# Patient Record
Sex: MALE | Race: WHITE | NOT HISPANIC OR LATINO | Employment: OTHER | URBAN - NONMETROPOLITAN AREA
[De-identification: names, ages, dates, MRNs, and addresses within clinical notes are randomized per-mention and may not be internally consistent; named-entity substitution may affect disease eponyms.]

---

## 2019-12-11 ENCOUNTER — HOSPITAL ENCOUNTER (EMERGENCY)
Facility: HOSPITAL | Age: 39
Discharge: HOME | End: 2019-12-11
Admitting: EMERGENCY MEDICINE

## 2019-12-11 VITALS
RESPIRATION RATE: 16 BRPM | WEIGHT: 228 LBS | DIASTOLIC BLOOD PRESSURE: 87 MMHG | OXYGEN SATURATION: 99 % | HEIGHT: 72 IN | SYSTOLIC BLOOD PRESSURE: 135 MMHG | BODY MASS INDEX: 30.88 KG/M2 | HEART RATE: 85 BPM | TEMPERATURE: 97.2 F

## 2019-12-11 DIAGNOSIS — S61.012A LACERATION OF LEFT THUMB WITHOUT FOREIGN BODY WITHOUT DAMAGE TO NAIL, INITIAL ENCOUNTER: Primary | ICD-10-CM

## 2019-12-11 PROCEDURE — 99283 EMERGENCY DEPT VISIT LOW MDM: CPT

## 2019-12-11 PROCEDURE — 90715 TDAP VACCINE 7 YRS/> IM: CPT | Performed by: NURSE PRACTITIONER

## 2019-12-11 PROCEDURE — 90471 IMMUNIZATION ADMIN: CPT | Performed by: NURSE PRACTITIONER

## 2019-12-11 PROCEDURE — 25010000002 TDAP 5-2.5-18.5 LF-MCG/0.5 SUSPENSION: Performed by: NURSE PRACTITIONER

## 2019-12-11 RX ORDER — LIDOCAINE HYDROCHLORIDE 10 MG/ML
10 INJECTION, SOLUTION INFILTRATION; PERINEURAL ONCE
Status: DISCONTINUED | OUTPATIENT
Start: 2019-12-11 | End: 2019-12-11 | Stop reason: HOSPADM

## 2019-12-11 RX ORDER — CEPHALEXIN 500 MG/1
500 CAPSULE ORAL 4 TIMES DAILY
Qty: 28 CAPSULE | Refills: 0 | Status: SHIPPED | OUTPATIENT
Start: 2019-12-11 | End: 2019-12-18

## 2019-12-11 RX ADMIN — TETANUS TOXOID, REDUCED DIPHTHERIA TOXOID AND ACELLULAR PERTUSSIS VACCINE, ADSORBED 0.5 ML: 5; 2.5; 8; 8; 2.5 SUSPENSION INTRAMUSCULAR at 09:33

## 2019-12-11 NOTE — DISCHARGE INSTRUCTIONS
Follow up with one of the [facility] physician groups below to setup primary care. If you have trouble making an appointment, please call the [facility] Nurse Line at [phone]    Dr. Trina Petty DO, Dr. Jones Johnson DO, and REINA Navarrete  [facility]  [address] [place] [address], [id number] [phone]    Dr. Marshal Erickson MD  [facility] - [address] [address], [address], [place], [place] [id number] [phone]    Dr. Boo Rosen DO, Dr. Raymond Guaman DO,  REINA Garvin, and REINA Galindo  [facility] - [address] [address], [address], [place], [place] [address] [phone]     Dr. Khadijah Anthony MD, and REINA Reich  [facility] [address] [id number] [phone]    Dr. Mauricio Javier MD and Dr. Hossein Garcia MD  [address], [address] [phone]    Dr. Ismael Zaman MD  [facility]  [address] [place] [address], [address], [place], [place], [address] [phone]    Dr. Que Brown MD  [facility]  [address], [place], [address] [phone]      Increase fluids.  Ibuprofen or Naproxen as needed for pain. Medication as ordered. Elevate hand.  Follow wound care instructions.  Sutures out in 7-10 days. Follow up with PCP tomorrow - call for appointment. Return to ED if condition does not improve or worsens

## 2019-12-11 NOTE — ED PROVIDER NOTES
Subjective   39 yom c/o laceration to the left thumb. He states he was taking down cabinets when one injured his left thumb. No active bleeding.  His TD is not UTD.  He has full ROM of the left thumb.          Review of Systems   Constitutional: Negative for activity change, appetite change, fatigue and fever.   HENT: Negative for congestion, ear pain, facial swelling and sore throat.    Eyes: Negative for discharge and visual disturbance.   Respiratory: Negative for apnea, chest tightness, shortness of breath, wheezing and stridor.    Cardiovascular: Negative for chest pain and palpitations.   Gastrointestinal: Negative for abdominal distention, abdominal pain, diarrhea, nausea and vomiting.   Genitourinary: Negative for difficulty urinating and dysuria.   Musculoskeletal: Negative for arthralgias and myalgias.   Skin: Negative for rash and wound.   Neurological: Negative for dizziness and seizures.   Psychiatric/Behavioral: Negative for agitation and confusion.       History reviewed. No pertinent past medical history.    Allergies   Allergen Reactions   • Morphine Paresthesia       Past Surgical History:   Procedure Laterality Date   • KNEE ACL RECONSTRUCTION Left        History reviewed. No pertinent family history.    Social History     Socioeconomic History   • Marital status: Single     Spouse name: Not on file   • Number of children: Not on file   • Years of education: Not on file   • Highest education level: Not on file   Tobacco Use   • Smoking status: Light Tobacco Smoker     Types: Cigarettes   • Tobacco comment: socially   Substance and Sexual Activity   • Alcohol use: Yes   • Drug use: Yes           Objective   Physical Exam   Constitutional: He is oriented to person, place, and time. He appears well-developed.   HENT:   Head: Normocephalic.   Eyes: Pupils are equal, round, and reactive to light. EOM are normal.   Neck: Normal range of motion. Neck supple.   Cardiovascular: Normal rate and regular  rhythm.   No murmur heard.  Pulmonary/Chest: Effort normal and breath sounds normal.   Abdominal: Soft. Bowel sounds are normal.   Musculoskeletal: Normal range of motion.   Neurological: He is alert and oriented to person, place, and time.   Skin: Skin is warm and dry.   Approximate 3.5cm laceration present to the lateral aspect of the left thumb.  Bleeding is controlled. He has full ROM of the left thumb   Psychiatric: He has a normal mood and affect.   Nursing note and vitals reviewed.      Laceration Repair  Date/Time: 12/11/2019 10:00 AM  Performed by: Andrea Cook APRN  Authorized by: Andrea Cook APRN     Consent:     Consent obtained:  Verbal    Consent given by:  Patient    Risks discussed:  Infection, pain, retained foreign body, poor cosmetic result, nerve damage, poor wound healing, vascular damage and tendon damage    Alternatives discussed:  No treatment  Anesthesia (see MAR for exact dosages):     Anesthesia method:  Local infiltration    Local anesthetic:  Lidocaine 1% w/o epi  Laceration details:     Location:  Finger    Finger location:  L thumb    Length (cm):  3.5  Repair type:     Repair type:  Simple  Pre-procedure details:     Preparation:  Patient was prepped and draped in usual sterile fashion  Exploration:     Wound exploration: wound explored through full range of motion      Contaminated: no    Treatment:     Area cleansed with:  Hibiclens    Amount of cleaning:  Extensive    Irrigation solution:  Sterile saline    Irrigation volume:  50    Irrigation method:  Syringe    Visualized foreign bodies/material removed: no    Skin repair:     Repair method:  Sutures    Suture size:  4-0    Suture material:  Nylon    Suture technique:  Simple interrupted    Number of sutures:  9  Approximation:     Approximation:  Close  Post-procedure details:     Dressing:  Antibiotic ointment and non-adherent dressing    Patient tolerance of procedure:  Tolerated well, no immediate  complications               ED Course                      No data recorded                        MDM  Number of Diagnoses or Management Options  Laceration of left thumb without foreign body without damage to nail, initial encounter: new and does not require workup  Risk of Complications, Morbidity, and/or Mortality  Presenting problems: low  Diagnostic procedures: minimal  Management options: low    Patient Progress  Patient progress: improved      Final diagnoses:   Laceration of left thumb without foreign body without damage to nail, initial encounter              Andrea Cook, APRN  12/12/19 1165

## 2019-12-20 ENCOUNTER — HOSPITAL ENCOUNTER (EMERGENCY)
Facility: HOSPITAL | Age: 39
Discharge: HOME | End: 2019-12-20
Admitting: EMERGENCY MEDICINE

## 2019-12-20 VITALS
RESPIRATION RATE: 14 BRPM | SYSTOLIC BLOOD PRESSURE: 153 MMHG | WEIGHT: 232 LBS | HEART RATE: 114 BPM | BODY MASS INDEX: 31.42 KG/M2 | OXYGEN SATURATION: 96 % | HEIGHT: 72 IN | TEMPERATURE: 97.9 F | DIASTOLIC BLOOD PRESSURE: 99 MMHG

## 2019-12-20 DIAGNOSIS — Z48.02 VISIT FOR SUTURE REMOVAL: Primary | ICD-10-CM

## 2019-12-20 PROCEDURE — 99201: CPT

## 2019-12-20 NOTE — DISCHARGE INSTRUCTIONS
Increase fluids.  Tylenol or motrin as needed for pain/fever.  Keep area clean and dry.  Follow up with PCP Monday - call for appointment. Return to ED if condition does not improve or worsens

## 2019-12-20 NOTE — ED PROVIDER NOTES
Subjective   39 yom here for suture removal.  He had 9 sutures placed to the left thumb on on 12/11/19. Area is well approximated and has no redness or drainage.          Review of Systems   Constitutional: Negative for activity change, appetite change, fatigue and fever.   HENT: Negative for congestion, ear pain, facial swelling and sore throat.    Eyes: Negative for discharge and visual disturbance.   Respiratory: Negative for apnea, chest tightness, shortness of breath, wheezing and stridor.    Cardiovascular: Negative for chest pain and palpitations.   Gastrointestinal: Negative for abdominal distention, abdominal pain, diarrhea, nausea and vomiting.   Genitourinary: Negative for difficulty urinating and dysuria.   Musculoskeletal: Negative for arthralgias and myalgias.   Skin: Positive for wound. Negative for rash.   Neurological: Negative for dizziness and seizures.   Psychiatric/Behavioral: Negative for agitation and confusion.       History reviewed. No pertinent past medical history.    Allergies   Allergen Reactions   • Morphine Paresthesia       Past Surgical History:   Procedure Laterality Date   • KNEE ACL RECONSTRUCTION Left        History reviewed. No pertinent family history.    Social History     Socioeconomic History   • Marital status: Single     Spouse name: Not on file   • Number of children: Not on file   • Years of education: Not on file   • Highest education level: Not on file   Tobacco Use   • Smoking status: Light Tobacco Smoker     Types: Cigarettes   • Smokeless tobacco: Never Used   • Tobacco comment: socially   Substance and Sexual Activity   • Alcohol use: Yes   • Drug use: Yes   • Sexual activity: Defer           Objective   Physical Exam   Skin:   Area to left thumb is well approximated.  There is no redness or drainage.  He has full ROM of the left thumb   Nursing note and vitals reviewed.      Procedures           ED Course                      No data recorded                         MDM  Number of Diagnoses or Management Options  Visit for suture removal: minor  Risk of Complications, Morbidity, and/or Mortality  Presenting problems: minimal  Diagnostic procedures: minimal  Management options: minimal    Patient Progress  Patient progress: improved      Final diagnoses:   Visit for suture removal              Andrea Cook, APRN  12/20/19 [id number]

## 2021-02-13 ENCOUNTER — OFFICE VISIT (OUTPATIENT)
Dept: URGENT CARE | Age: 41
End: 2021-02-13

## 2021-02-13 VITALS
BODY MASS INDEX: 33.89 KG/M2 | DIASTOLIC BLOOD PRESSURE: 96 MMHG | HEART RATE: 125 BPM | WEIGHT: 250.2 LBS | TEMPERATURE: 97.8 F | HEIGHT: 72 IN | OXYGEN SATURATION: 97 % | RESPIRATION RATE: 20 BRPM | SYSTOLIC BLOOD PRESSURE: 164 MMHG

## 2021-02-13 DIAGNOSIS — S01.81XA FACIAL LACERATION, INITIAL ENCOUNTER: Primary | ICD-10-CM

## 2021-02-13 PROCEDURE — 12051 INTMD RPR FACE/MM 2.5 CM/<: CPT | Performed by: PHYSICIAN ASSISTANT

## 2021-02-13 RX ADMIN — Medication: at 15:30

## 2021-02-13 ASSESSMENT — VISUAL ACUITY: OU: 1

## 2021-02-13 NOTE — PROGRESS NOTES
Subjective:      Patient ID: Giovanni Anguiano is a 45 y.o. male. HPI  Mr. Raphael Barragan presents with a laceration above his left eyebrow. He reports he was at home drinking bourbon last night. He does not recall how he injured himself. He thinks it happened around 7 am.  He delayed coming in to let the alcohol wear off. He denies any other injuries. He denies any vision changes, headache etc.      Review of Systems   All other systems reviewed and are negative. Objective:   Physical Exam  HENT:      Head:        Comments: No step offs upon palpation of the left orbital bone. Eyes:      General: Lids are normal. Vision grossly intact. Gaze aligned appropriately. Extraocular Movements:      Right eye: Normal extraocular motion. Left eye: Normal extraocular motion. Pupils: Pupils are equal, round, and reactive to light. Comments: There is swelling surrounding the laceration. Neck:      Musculoskeletal: Full passive range of motion without pain. Assessment:      Laceration above left eyebrow      Plan:      We discussed repair of his laceration and he would like to proceed.           Guillermina Mccormack PA-C

## 2021-02-13 NOTE — PROGRESS NOTES
PROCEDURE:  The wound was cleaned with saline. The skin was cleaned with alcohol and anesthetized with 1% lidocaine. The wound was irrigated with more saline. The wound was then prepped with betadine. Sterile drapes were used. 5-0 vicryl was used in an interrupted fashion to close the deeper tissues. A running 5-0 vicryl subcuticular stitch was used to close the skin. Dermabond was applied. He tolerated the procedure well.

## 2021-03-27 ENCOUNTER — IMMUNIZATION (OUTPATIENT)
Dept: VACCINE CLINIC | Facility: HOSPITAL | Age: 41
End: 2021-03-27

## 2021-03-27 PROCEDURE — 0011A: CPT | Performed by: OBSTETRICS & GYNECOLOGY

## 2021-03-27 PROCEDURE — 91301 HC SARSCO02 VAC 100MCG/0.5ML IM: CPT | Performed by: OBSTETRICS & GYNECOLOGY

## 2021-04-24 ENCOUNTER — IMMUNIZATION (OUTPATIENT)
Dept: VACCINE CLINIC | Facility: HOSPITAL | Age: 41
End: 2021-04-24

## 2021-04-24 PROCEDURE — 0012A: CPT | Performed by: OBSTETRICS & GYNECOLOGY

## 2021-04-24 PROCEDURE — 91301 HC SARSCO02 VAC 100MCG/0.5ML IM: CPT | Performed by: OBSTETRICS & GYNECOLOGY
